# Patient Record
Sex: FEMALE | Race: WHITE | NOT HISPANIC OR LATINO | Employment: FULL TIME | URBAN - METROPOLITAN AREA
[De-identification: names, ages, dates, MRNs, and addresses within clinical notes are randomized per-mention and may not be internally consistent; named-entity substitution may affect disease eponyms.]

---

## 2017-11-01 ENCOUNTER — HOSPITAL ENCOUNTER (EMERGENCY)
Facility: HOSPITAL | Age: 27
Discharge: HOME | End: 2017-11-01
Attending: EMERGENCY MEDICINE | Admitting: EMERGENCY MEDICINE
Payer: OTHER MISCELLANEOUS

## 2017-11-01 VITALS
RESPIRATION RATE: 18 BRPM | WEIGHT: 195 LBS | HEART RATE: 65 BPM | SYSTOLIC BLOOD PRESSURE: 143 MMHG | TEMPERATURE: 97.2 F | DIASTOLIC BLOOD PRESSURE: 63 MMHG | OXYGEN SATURATION: 99 %

## 2017-11-01 DIAGNOSIS — Z77.21 PATIENT EXPOSURE TO BODY FLUIDS: Primary | ICD-10-CM

## 2017-11-01 PROCEDURE — 99282 EMERGENCY DEPT VISIT SF MDM: CPT
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
## 2017-11-01 NOTE — DISCHARGE INSTRUCTIONS
Body Substance Exposure   WHAT YOU NEED TO KNOW:   Body substance exposure is when you come in contact with another person's blood or body fluid that contains blood  Semen or vaginal fluid can also spread infection  Contact may place you at risk for hepatitis B virus (HBV), human immunodeficiency virus (HIV), or hepatitis C virus (HCV)  DISCHARGE INSTRUCTIONS:   Ways that body substance exposure can occur:   · A needle stick or a cut from a sharp object    · Contact with an open wound, such as a cut, chapped skin, or an abrasion     · Contact with the eyes or mucus membrane, such as the lining of the mouth or nose    · Human bite  What to do when exposed to a body substance:   · Clean the area immediately  Wash an open wound with soap and clean water  Flush your eyes with saline solution or water  Rinse mucus membranes with water or saline solution  · Contact your healthcare provider as soon as possible  He will ask how the exposure happened and where the blood or body fluid touched your body  If possible, tell your healthcare provider about the person's health status and history, such as vaccinations  Treatment works best if started as soon as possible  Treatment that may be given for body substance exposure:  Postexposure prophylaxis (PEP) is medical treatment that may protect a person from infection after exposure to another person's body fluids  PEP may be needed if the person whose fluids you were exposed to has a known infection  Do not donate blood, organs, tissues, or semen until your follow-up is completed at 6 months  · PEP for HBV  may include HBV vaccinations or medicine to prevent HVB  This treatment works best if started within 24 hours of exposure  · PEP for HIV  may include 2 or 3 types of medicine to prevent HIV  This treatment works best if started within 72 hours of exposure  Continue treatment for 4 weeks   Practice safe sex to prevent spreading HIV and to prevent pregnancy during the follow-up period  If you are breastfeeding, your healthcare provider may recommend that you stop  Ask your healthcare provider if you can breastfeed  · PEP for HCV  is not  available  You will need to be tested for HCV and treated if you were infected  Follow up with your healthcare provider as directed: You will need more blood tests  PEP for HIV often causes side effects  Talk with your healthcare provider about your symptoms  He will need to make sure you are taking the medicine correctly  Write down your questions so you remember to ask them during your visits  Prevent body substance exposure: If you care for another person who has HBV, HIV, or HCV, protect yourself and others from infection:  · Wash your hands thoroughly  before and after you provide medical care  · Use protective equipment  Gloves or a face mask may protect your hands, nose, and mouth from splashes of blood or body fluid  · Do not recap needles after use  Recapping needles increases your risk of a needle stick  · Throw away needles in a safe container  A hard container with a lid may prevent accidental needle sticks  Contact your healthcare provider if:   · You have a fever  · You have a rash  · You have weakness or muscle pain  · You have abdominal pain, nausea, or vomiting  · You have diarrhea  · You have a headache  · You have questions or concerns about your condition or care  © 2017 [employer]  Information is for End User's use only and may not be sold, redistributed or otherwise used for commercial purposes  All illustrations and images included in CareNotes® are the copyrighted property of [employer]  or [employer]  The above information is an  only  It is not intended as medical advice for individual conditions or treatments  Talk to your doctor, nurse or pharmacist before following any medical regimen to see if it is safe and effective for you
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
## 2019-02-16 ENCOUNTER — TRANSCRIBE ORDERS (OUTPATIENT)
Dept: ADMINISTRATIVE | Age: 29
End: 2019-02-16

## 2019-02-16 ENCOUNTER — APPOINTMENT (OUTPATIENT)
Dept: RADIOLOGY | Age: 29
End: 2019-02-16
Attending: PREVENTIVE MEDICINE
Payer: OTHER MISCELLANEOUS

## 2019-02-16 ENCOUNTER — OCCMED (OUTPATIENT)
Dept: URGENT CARE | Age: 29
End: 2019-02-16
Payer: OTHER MISCELLANEOUS

## 2019-02-16 ENCOUNTER — APPOINTMENT (OUTPATIENT)
Dept: URGENT CARE | Age: 29
End: 2019-02-16
Payer: OTHER MISCELLANEOUS

## 2019-02-16 DIAGNOSIS — Z20.1 EXPOSURE TO TB: Primary | ICD-10-CM

## 2019-02-16 DIAGNOSIS — Z20.1 EXPOSURE TO TB: ICD-10-CM

## 2019-02-16 PROCEDURE — 71046 X-RAY EXAM CHEST 2 VIEWS: CPT

## 2019-02-16 PROCEDURE — 86480 TB TEST CELL IMMUN MEASURE: CPT

## 2019-02-18 LAB
GAMMA INTERFERON BACKGROUND BLD IA-ACNC: 0.04 IU/ML
M TB IFN-G BLD-IMP: NEGATIVE
M TB IFN-G CD4+ BCKGRND COR BLD-ACNC: 0.09 IU/ML
M TB IFN-G CD4+ BCKGRND COR BLD-ACNC: 0.12 IU/ML
MITOGEN IGNF BCKGRD COR BLD-ACNC: >10 IU/ML

## 2019-02-20 ENCOUNTER — APPOINTMENT (OUTPATIENT)
Dept: URGENT CARE | Age: 29
End: 2019-02-20
Attending: PHYSICIAN ASSISTANT
Payer: OTHER MISCELLANEOUS

## 2019-02-20 ENCOUNTER — APPOINTMENT (OUTPATIENT)
Dept: URGENT CARE | Age: 29
End: 2019-02-20
Payer: OTHER MISCELLANEOUS

## 2019-02-20 DIAGNOSIS — Z20.1 CONTACT WITH AND (SUSPECTED) EXPOSURE TO TUBERCULOSIS: ICD-10-CM

## 2019-02-20 DIAGNOSIS — Z20.1 CONTACT WITH AND (SUSPECTED) EXPOSURE TO TUBERCULOSIS: Primary | ICD-10-CM

## 2019-02-20 LAB
ALBUMIN SERPL BCP-MCNC: 4.1 G/DL (ref 3.5–5)
ALBUMIN SERPL BCP-MCNC: 4.2 G/DL (ref 3.5–5)
ALP SERPL-CCNC: 67 U/L (ref 46–116)
ALP SERPL-CCNC: 68 U/L (ref 46–116)
ALT SERPL W P-5'-P-CCNC: 16 U/L (ref 12–78)
ALT SERPL W P-5'-P-CCNC: 16 U/L (ref 12–78)
ANION GAP SERPL CALCULATED.3IONS-SCNC: 12 MMOL/L (ref 4–13)
AST SERPL W P-5'-P-CCNC: 12 U/L (ref 5–45)
AST SERPL W P-5'-P-CCNC: 14 U/L (ref 5–45)
BASOPHILS # BLD AUTO: 0.04 THOUSANDS/ΜL (ref 0–0.1)
BASOPHILS NFR BLD AUTO: 1 % (ref 0–1)
BILIRUB DIRECT SERPL-MCNC: 0.13 MG/DL (ref 0–0.2)
BILIRUB SERPL-MCNC: 0.38 MG/DL (ref 0.2–1)
BILIRUB SERPL-MCNC: 0.39 MG/DL (ref 0.2–1)
BUN SERPL-MCNC: 9 MG/DL (ref 5–25)
CALCIUM SERPL-MCNC: 8.3 MG/DL (ref 8.3–10.1)
CHLORIDE SERPL-SCNC: 105 MMOL/L (ref 100–108)
CO2 SERPL-SCNC: 23 MMOL/L (ref 21–32)
CREAT SERPL-MCNC: 0.74 MG/DL (ref 0.6–1.3)
EOSINOPHIL # BLD AUTO: 0 THOUSAND/ΜL (ref 0–0.61)
EOSINOPHIL NFR BLD AUTO: 0 % (ref 0–6)
ERYTHROCYTE [DISTWIDTH] IN BLOOD BY AUTOMATED COUNT: 11.8 % (ref 11.6–15.1)
GFR SERPL CREATININE-BSD FRML MDRD: 110 ML/MIN/1.73SQ M
GLUCOSE SERPL-MCNC: 85 MG/DL (ref 65–140)
HCT VFR BLD AUTO: 40.7 % (ref 34.8–46.1)
HGB BLD-MCNC: 13.5 G/DL (ref 11.5–15.4)
IMM GRANULOCYTES # BLD AUTO: 0.02 THOUSAND/UL (ref 0–0.2)
IMM GRANULOCYTES NFR BLD AUTO: 0 % (ref 0–2)
LYMPHOCYTES # BLD AUTO: 0.98 THOUSANDS/ΜL (ref 0.6–4.47)
LYMPHOCYTES NFR BLD AUTO: 14 % (ref 14–44)
MCH RBC QN AUTO: 31.8 PG (ref 26.8–34.3)
MCHC RBC AUTO-ENTMCNC: 33.2 G/DL (ref 31.4–37.4)
MCV RBC AUTO: 96 FL (ref 82–98)
MONOCYTES # BLD AUTO: 0.77 THOUSAND/ΜL (ref 0.17–1.22)
MONOCYTES NFR BLD AUTO: 11 % (ref 4–12)
NEUTROPHILS # BLD AUTO: 5.12 THOUSANDS/ΜL (ref 1.85–7.62)
NEUTS SEG NFR BLD AUTO: 74 % (ref 43–75)
NRBC BLD AUTO-RTO: 0 /100 WBCS
PLATELET # BLD AUTO: 206 THOUSANDS/UL (ref 149–390)
PMV BLD AUTO: 10.9 FL (ref 8.9–12.7)
POTASSIUM SERPL-SCNC: 3.7 MMOL/L (ref 3.5–5.3)
PROT SERPL-MCNC: 7 G/DL (ref 6.4–8.2)
PROT SERPL-MCNC: 7.4 G/DL (ref 6.4–8.2)
RBC # BLD AUTO: 4.24 MILLION/UL (ref 3.81–5.12)
SODIUM SERPL-SCNC: 140 MMOL/L (ref 136–145)
WBC # BLD AUTO: 6.93 THOUSAND/UL (ref 4.31–10.16)

## 2019-02-20 PROCEDURE — 85025 COMPLETE CBC W/AUTO DIFF WBC: CPT

## 2019-02-20 PROCEDURE — 80076 HEPATIC FUNCTION PANEL: CPT

## 2019-02-20 PROCEDURE — 99213 OFFICE O/P EST LOW 20 MIN: CPT | Performed by: PREVENTIVE MEDICINE

## 2019-02-20 PROCEDURE — 80053 COMPREHEN METABOLIC PANEL: CPT

## 2021-04-08 DIAGNOSIS — Z23 ENCOUNTER FOR IMMUNIZATION: ICD-10-CM

## 2024-10-10 ENCOUNTER — TELEPHONE (OUTPATIENT)
Dept: DERMATOLOGY | Facility: CLINIC | Age: 34
End: 2024-10-10

## 2024-10-10 NOTE — TELEPHONE ENCOUNTER
I tried to call pt to inform them the provider had a change in schedule and want to see if they would be willing to go to one of our other locations ([facility] or [place]). I ask them to give us a call back to r/s their appt.

## 2025-08-01 ENCOUNTER — OFFICE VISIT (OUTPATIENT)
Dept: FAMILY MEDICINE CLINIC | Facility: CLINIC | Age: 35
End: 2025-08-01
Payer: COMMERCIAL

## 2025-08-01 VITALS
DIASTOLIC BLOOD PRESSURE: 72 MMHG | OXYGEN SATURATION: 98 % | SYSTOLIC BLOOD PRESSURE: 116 MMHG | WEIGHT: 220 LBS | TEMPERATURE: 97 F | BODY MASS INDEX: 32.49 KG/M2 | HEART RATE: 69 BPM

## 2025-08-01 DIAGNOSIS — F41.9 ANXIETY: Primary | ICD-10-CM

## 2025-08-01 PROCEDURE — 99203 OFFICE O/P NEW LOW 30 MIN: CPT | Performed by: FAMILY MEDICINE

## 2025-08-01 RX ORDER — ESCITALOPRAM OXALATE 5 MG/1
5 TABLET ORAL DAILY
Qty: 30 TABLET | Refills: 3 | Status: SHIPPED | OUTPATIENT
Start: 2025-08-01

## 2025-08-01 RX ORDER — NORGESTIMATE AND ETHINYL ESTRADIOL 0.25-0.035
1 KIT ORAL DAILY
COMMUNITY

## 2025-08-18 ENCOUNTER — NURSE TRIAGE (OUTPATIENT)
Age: 35
End: 2025-08-18